# Patient Record
Sex: FEMALE | Race: WHITE | NOT HISPANIC OR LATINO | URBAN - METROPOLITAN AREA
[De-identification: names, ages, dates, MRNs, and addresses within clinical notes are randomized per-mention and may not be internally consistent; named-entity substitution may affect disease eponyms.]

---

## 2021-06-16 ENCOUNTER — OFFICE VISIT (OUTPATIENT)
Dept: URBAN - METROPOLITAN AREA TELEHEALTH 2 | Facility: TELEHEALTH | Age: 76
End: 2021-06-16
Payer: MEDICARE

## 2021-06-16 DIAGNOSIS — Z12.11 COLON CANCER SCREENING: ICD-10-CM

## 2021-06-16 DIAGNOSIS — R05 COUGH: ICD-10-CM

## 2021-06-16 DIAGNOSIS — K21.9 GERD: ICD-10-CM

## 2021-06-16 PROBLEM — 235595009 GASTROESOPHAGEAL REFLUX DISEASE: Status: ACTIVE | Noted: 2021-06-16

## 2021-06-16 PROCEDURE — G8420 CALC BMI NORM PARAMETERS: HCPCS | Performed by: INTERNAL MEDICINE

## 2021-06-16 PROCEDURE — G8482 FLU IMMUNIZE ORDER/ADMIN: HCPCS | Performed by: INTERNAL MEDICINE

## 2021-06-16 PROCEDURE — 99214 OFFICE O/P EST MOD 30 MIN: CPT | Performed by: INTERNAL MEDICINE

## 2021-06-16 PROCEDURE — G9903 PT SCRN TBCO ID AS NON USER: HCPCS | Performed by: INTERNAL MEDICINE

## 2021-06-16 PROCEDURE — G8427 DOCREV CUR MEDS BY ELIG CLIN: HCPCS | Performed by: INTERNAL MEDICINE

## 2021-06-16 PROCEDURE — 1036F TOBACCO NON-USER: CPT | Performed by: INTERNAL MEDICINE

## 2021-06-16 PROCEDURE — 3017F COLORECTAL CA SCREEN DOC REV: CPT | Performed by: INTERNAL MEDICINE

## 2021-06-16 RX ORDER — PROPRANOLOL HYDROCHLORIDE 60 MG/1
CAPSULE, EXTENDED RELEASE ORAL
Qty: 0 | Refills: 0 | COMMUNITY
Start: 1900-01-01

## 2021-06-16 RX ORDER — AZELASTINE HYDROCHLORIDE AND FLUTICASONE PROPIONATE 137; 50 UG/1; UG/1
SPRAY 1 SPRAY IN EACH NOSTRIL BY INTRANASAL ROUTE 2 TIMES PER DAY SPRAY, METERED NASAL 2
Qty: 1 | Refills: 0 | COMMUNITY
Start: 1900-01-01

## 2021-06-16 RX ORDER — TOPIRAMATE 100 MG/1
CAPSULE, EXTENDED RELEASE ORAL
Qty: 0 | Refills: 0 | COMMUNITY
Start: 1900-01-01

## 2021-06-16 RX ORDER — PANTOPRAZOLE SODIUM 40 MG/1
1 TABLET TABLET, DELAYED RELEASE ORAL
Qty: 180 TABLET | Refills: 3 | OUTPATIENT
Start: 2021-06-16

## 2021-06-16 RX ORDER — ROPINIROLE 1 MG/1
TABLET, FILM COATED ORAL
Qty: 0 | Refills: 0 | COMMUNITY
Start: 1900-01-01

## 2021-06-16 RX ORDER — LEVOTHYROXINE SODIUM 112 UG/1
TABLET ORAL
Qty: 0 | Refills: 0 | COMMUNITY
Start: 1900-01-01

## 2021-06-16 NOTE — HPI-TODAY'S VISIT:
f/u visit. Last seen 2019.  She did fairly well for a time on once daily Pantoprazole, but symptoms returned Cough is less frequent, but more severe.  She does wonder if it is related to allergy with gardening We reviewed EGD and BRAVO from 2019.  DeMeester score>14.5 indicating possible reflux related symptoms.  She is not interested in surgery for reflux, which is reasonable. No weight loss    . PRIOR VISIT: [facility], [place] diverticulitis s/p resection 3-4yrs ago, thyroid ablation s/p radiated iodine, and Migraines She has a chronic cough for about 2 years No clear etiology Worse at night Has tried to eliminate medications She saw an ENT who diagnosed reflux Started Omeprazole 40mg once a day about a year ago. Reports almost no benefit Labs from 3/2019 reviewed. Mild ALT elevation. Otherwise unremarkable Discussed case with PCP. Pt seeing pulmonary

## 2022-07-15 ENCOUNTER — TELEPHONE ENCOUNTER (OUTPATIENT)
Dept: URBAN - METROPOLITAN AREA CLINIC 6 | Facility: CLINIC | Age: 77
End: 2022-07-15

## 2022-07-15 RX ORDER — PANTOPRAZOLE SODIUM 40 MG/1
1 TABLET TABLET, DELAYED RELEASE ORAL
Qty: 180 TABLET | Refills: 3
Start: 2021-06-16

## 2022-08-02 ENCOUNTER — DASHBOARD ENCOUNTERS (OUTPATIENT)
Age: 77
End: 2022-08-02

## 2022-08-03 ENCOUNTER — OFFICE VISIT (OUTPATIENT)
Dept: URBAN - METROPOLITAN AREA TELEHEALTH 2 | Facility: TELEHEALTH | Age: 77
End: 2022-08-03
Payer: MEDICARE

## 2022-08-03 VITALS — BODY MASS INDEX: 27.6 KG/M2 | WEIGHT: 150 LBS | HEIGHT: 62 IN

## 2022-08-03 DIAGNOSIS — R05.9 COUGH: ICD-10-CM

## 2022-08-03 DIAGNOSIS — Z12.11 COLON CANCER SCREENING: ICD-10-CM

## 2022-08-03 DIAGNOSIS — K21.9 GERD: ICD-10-CM

## 2022-08-03 PROCEDURE — G8420 CALC BMI NORM PARAMETERS: HCPCS | Performed by: INTERNAL MEDICINE

## 2022-08-03 PROCEDURE — 99213 OFFICE O/P EST LOW 20 MIN: CPT | Performed by: INTERNAL MEDICINE

## 2022-08-03 PROCEDURE — G8427 DOCREV CUR MEDS BY ELIG CLIN: HCPCS | Performed by: INTERNAL MEDICINE

## 2022-08-03 PROCEDURE — 3017F COLORECTAL CA SCREEN DOC REV: CPT | Performed by: INTERNAL MEDICINE

## 2022-08-03 PROCEDURE — 1036F TOBACCO NON-USER: CPT | Performed by: INTERNAL MEDICINE

## 2022-08-03 PROCEDURE — G9903 PT SCRN TBCO ID AS NON USER: HCPCS | Performed by: INTERNAL MEDICINE

## 2022-08-03 PROCEDURE — G8482 FLU IMMUNIZE ORDER/ADMIN: HCPCS | Performed by: INTERNAL MEDICINE

## 2022-08-03 RX ORDER — AZELASTINE HYDROCHLORIDE AND FLUTICASONE PROPIONATE 137; 50 UG/1; UG/1
SPRAY 1 SPRAY IN EACH NOSTRIL BY INTRANASAL ROUTE 2 TIMES PER DAY SPRAY, METERED NASAL 2
Qty: 1 | Refills: 0 | Status: ON HOLD | COMMUNITY
Start: 1900-01-01

## 2022-08-03 RX ORDER — PANTOPRAZOLE SODIUM 40 MG/1
1 TABLET TABLET, DELAYED RELEASE ORAL
Qty: 180 TABLET | Refills: 3 | Status: ACTIVE | COMMUNITY
Start: 2021-06-16

## 2022-08-03 RX ORDER — TOPIRAMATE 100 MG/1
CAPSULE, EXTENDED RELEASE ORAL
Qty: 0 | Refills: 0 | Status: ON HOLD | COMMUNITY
Start: 1900-01-01

## 2022-08-03 RX ORDER — LEVOTHYROXINE SODIUM 112 UG/1
TABLET ORAL
Qty: 0 | Refills: 0 | Status: ON HOLD | COMMUNITY
Start: 1900-01-01

## 2022-08-03 RX ORDER — ROPINIROLE 1 MG/1
TABLET, FILM COATED ORAL
Qty: 0 | Refills: 0 | Status: ON HOLD | COMMUNITY
Start: 1900-01-01

## 2022-08-03 RX ORDER — PROPRANOLOL HYDROCHLORIDE 60 MG/1
CAPSULE, EXTENDED RELEASE ORAL
Qty: 0 | Refills: 0 | Status: ON HOLD | COMMUNITY
Start: 1900-01-01

## 2022-08-03 RX ORDER — PANTOPRAZOLE SODIUM 40 MG/1
1 TABLET TABLET, DELAYED RELEASE ORAL
Qty: 180 TABLET | Refills: 3

## 2022-08-03 NOTE — HPI-TODAY'S VISIT:
f/u visit. Doing well on BID Protonix No dysphagia  No cough  . PRIRO VISIT: Last seen 2019.  She did fairly well for a time on once daily Pantoprazole, but symptoms returned Cough is less frequent, but more severe.  She does wonder if it is related to allergy with gardening We reviewed EGD and BRAVO from 2019.  DeMeester score>14.5 indicating possible reflux related symptoms.  She is not interested in surgery for reflux, which is reasonable. No weight loss    . PRIOR VISIT: [facility] diverticulitis s/p resection 3-4yrs ago, thyroid ablation s/p radiated iodine, and Migraines She has a chronic cough for about 2 years No clear etiology Worse at night Has tried to eliminate medications She saw an ENT who diagnosed reflux Started Omeprazole 40mg once a day about a year ago. Reports almost no benefit Labs from 3/2019 reviewed. Mild ALT elevation. Otherwise unremarkable Discussed case with PCP. Pt seeing pulmonary